# Patient Record
Sex: MALE | Race: WHITE | NOT HISPANIC OR LATINO | Employment: FULL TIME | URBAN - METROPOLITAN AREA
[De-identification: names, ages, dates, MRNs, and addresses within clinical notes are randomized per-mention and may not be internally consistent; named-entity substitution may affect disease eponyms.]

---

## 2017-03-14 ENCOUNTER — OFFICE VISIT (OUTPATIENT)
Dept: FAMILY MEDICINE CLINIC | Facility: CLINIC | Age: 32
End: 2017-03-14

## 2017-03-14 VITALS
DIASTOLIC BLOOD PRESSURE: 78 MMHG | HEIGHT: 68 IN | TEMPERATURE: 97.9 F | WEIGHT: 163.5 LBS | SYSTOLIC BLOOD PRESSURE: 108 MMHG | HEART RATE: 80 BPM | RESPIRATION RATE: 20 BRPM | BODY MASS INDEX: 24.78 KG/M2

## 2017-03-14 DIAGNOSIS — A63.0 CONDYLOMA ACUMINATA: ICD-10-CM

## 2017-03-14 DIAGNOSIS — Z20.2 POSSIBLE EXPOSURE TO STD: Primary | ICD-10-CM

## 2017-03-14 PROCEDURE — 99213 OFFICE O/P EST LOW 20 MIN: CPT | Performed by: FAMILY MEDICINE

## 2017-03-14 RX ORDER — IMIQUIMOD 12.5 MG/.25G
CREAM TOPICAL
Qty: 24 EACH | Refills: 3 | Status: SHIPPED | OUTPATIENT
Start: 2017-03-14 | End: 2017-03-15

## 2017-03-14 NOTE — PROGRESS NOTES
Subjective   Freddie St is a 31 y.o. male.     History of Present Illness     He has had unprotected sex in the last 6 months, 3 partners  They did not have anything but he is worried about exposure  He does have HPV, genital warts  No new lesions, no penile discharge      Review of Systems   Constitutional: Negative.    Genitourinary:        Genital warts       Objective   Physical Exam   Constitutional: He appears well-developed and well-nourished. No distress.   Cardiovascular: Normal rate, regular rhythm and normal heart sounds.    Pulmonary/Chest: Effort normal and breath sounds normal.   Psychiatric: He has a normal mood and affect. His behavior is normal.   Nursing note and vitals reviewed.      Assessment/Plan   Freddie was seen today for std testing.    Diagnoses and all orders for this visit:    Possible exposure to STD  -     Chlamydia trachomatis, Neisseria gonorrhoeae, Trichomonas vaginalis, PCR  -     HIV-1 / O / 2 Ag / Antibody 4th Generation  -     Hepatitis C Antibody  -     RPR    Condyloma acuminata  -     imiquimod (ALDARA) 5 % cream; Apply to lesions three times a week up to 16 weeks for clearance      Check labs, f/u pending results.  aldara for genital warts and consider podofilox in the future. Pt to call back with any issues

## 2017-03-15 ENCOUNTER — OFFICE VISIT (OUTPATIENT)
Dept: FAMILY MEDICINE CLINIC | Facility: CLINIC | Age: 32
End: 2017-03-15

## 2017-03-15 VITALS
RESPIRATION RATE: 16 BRPM | HEART RATE: 104 BPM | HEIGHT: 68 IN | BODY MASS INDEX: 24.92 KG/M2 | SYSTOLIC BLOOD PRESSURE: 110 MMHG | DIASTOLIC BLOOD PRESSURE: 75 MMHG | WEIGHT: 164.4 LBS | TEMPERATURE: 100.1 F

## 2017-03-15 DIAGNOSIS — R68.89 FLU-LIKE SYMPTOMS: ICD-10-CM

## 2017-03-15 DIAGNOSIS — R50.9 FEVER, UNSPECIFIED FEVER CAUSE: Primary | ICD-10-CM

## 2017-03-15 DIAGNOSIS — R52 BODY ACHES: ICD-10-CM

## 2017-03-15 LAB
C TRACH RRNA SPEC QL NAA+PROBE: NEGATIVE
EXPIRATION DATE: NORMAL
FLUAV AG NPH QL: NEGATIVE
FLUBV AG NPH QL: NEGATIVE
HCV AB S/CO SERPL IA: 0.2 S/CO RATIO (ref 0–0.9)
HIV 1+2 AB+HIV1 P24 AG SERPL QL IA: NON REACTIVE
INTERNAL CONTROL: NORMAL
Lab: NORMAL
N GONORRHOEA RRNA SPEC QL NAA+PROBE: NEGATIVE
RPR SER QL: NON REACTIVE
T VAGINALIS RRNA SPEC QL NAA+PROBE: NEGATIVE

## 2017-03-15 PROCEDURE — 87804 INFLUENZA ASSAY W/OPTIC: CPT | Performed by: NURSE PRACTITIONER

## 2017-03-15 PROCEDURE — 99213 OFFICE O/P EST LOW 20 MIN: CPT | Performed by: NURSE PRACTITIONER

## 2017-03-15 RX ORDER — OSELTAMIVIR PHOSPHATE 75 MG/1
75 CAPSULE ORAL 2 TIMES DAILY
Qty: 10 CAPSULE | Refills: 0 | Status: SHIPPED | OUTPATIENT
Start: 2017-03-15 | End: 2017-04-25

## 2017-03-15 NOTE — PROGRESS NOTES
Subjective   Freddie St is a 31 y.o. male.     History of Present Illness   Fever, chills, body aches and HA some nausea that started this morning  Just wants to go home and go to bed  Thinks he has the flu  No OTC meds yet  No vomiting or abdominal pains    The following portions of the patient's history were reviewed and updated as appropriate: allergies, current medications, past family history, past medical history, past social history, past surgical history and problem list.    Review of Systems   Constitutional: Positive for chills, fatigue and fever.   HENT: Positive for rhinorrhea, sneezing and sore throat.    Eyes: Negative.    Respiratory: Positive for cough. Negative for wheezing.    Cardiovascular: Negative.    Gastrointestinal: Positive for nausea. Negative for abdominal distention, abdominal pain and vomiting.   Endocrine: Negative.    Genitourinary: Negative.    Musculoskeletal: Positive for myalgias.   Skin: Negative.    Allergic/Immunologic: Negative.    Neurological: Negative.    Hematological: Negative.    Psychiatric/Behavioral: Negative.        Objective   Physical Exam   Constitutional: He is oriented to person, place, and time. Vital signs are normal. He appears well-developed and well-nourished. He appears ill. No distress.   HENT:   Head: Normocephalic.   Right Ear: Tympanic membrane, external ear and ear canal normal.   Left Ear: Tympanic membrane, external ear and ear canal normal.   Nose: Nose normal. No mucosal edema or rhinorrhea. Right sinus exhibits no maxillary sinus tenderness and no frontal sinus tenderness. Left sinus exhibits no maxillary sinus tenderness and no frontal sinus tenderness.   Mouth/Throat: Uvula is midline, oropharynx is clear and moist and mucous membranes are normal.   Eyes: Conjunctivae and lids are normal. Pupils are equal, round, and reactive to light.   Neck: Trachea normal and normal range of motion. Neck supple. Carotid bruit is not present. No  thyroid mass present.   Cardiovascular: Normal rate, regular rhythm, S1 normal, S2 normal and normal heart sounds.    Pulmonary/Chest: Effort normal and breath sounds normal. No respiratory distress. He has no wheezes. He has no rales.   Abdominal: Soft. Normal appearance and bowel sounds are normal.   Lymphadenopathy:        Head (right side): No tonsillar, no preauricular, no posterior auricular and no occipital adenopathy present.        Head (left side): No tonsillar, no preauricular, no posterior auricular and no occipital adenopathy present.     He has no cervical adenopathy.   Neurological: He is alert and oriented to person, place, and time.   Skin: Skin is warm, dry and intact. No rash noted. No cyanosis. Nails show no clubbing.   Psychiatric: He has a normal mood and affect. His speech is normal and behavior is normal. Judgment and thought content normal. Cognition and memory are normal.   Nursing note and vitals reviewed.      Assessment/Plan   Ferddie was seen today for fever, h.a, nausea.    Diagnoses and all orders for this visit:    Fever, unspecified fever cause  -     POCT Influenza A/B    Body aches    Flu-like symptoms    Other orders  -     oseltamivir (TAMIFLU) 75 MG capsule; Take 1 capsule by mouth 2 (Two) Times a Day.    Flu A&B are negative pt informed   Will treat with Tamiflu as he has symptoms suggestive of the flu  Off work for 2 days  Rest and fluids, Ibuprofen for HA and fever as needed, OTC meds to treat cold sx  F/u if needed.

## 2017-03-21 ENCOUNTER — TELEPHONE (OUTPATIENT)
Dept: FAMILY MEDICINE CLINIC | Facility: CLINIC | Age: 32
End: 2017-03-21

## 2017-03-21 NOTE — TELEPHONE ENCOUNTER
----- Message from Evelyn St sent at 3/16/2017  3:47 PM EDT -----  Patient returning your call. Please call back at [phone].

## 2017-04-25 ENCOUNTER — OFFICE VISIT (OUTPATIENT)
Dept: FAMILY MEDICINE CLINIC | Facility: CLINIC | Age: 32
End: 2017-04-25

## 2017-04-25 VITALS
WEIGHT: 162 LBS | DIASTOLIC BLOOD PRESSURE: 74 MMHG | SYSTOLIC BLOOD PRESSURE: 116 MMHG | HEART RATE: 79 BPM | RESPIRATION RATE: 18 BRPM | HEIGHT: 68 IN | OXYGEN SATURATION: 100 % | BODY MASS INDEX: 24.55 KG/M2 | TEMPERATURE: 98.2 F

## 2017-04-25 DIAGNOSIS — R17 SCLERAL ICTERUS: ICD-10-CM

## 2017-04-25 DIAGNOSIS — R11.0 NAUSEA: ICD-10-CM

## 2017-04-25 DIAGNOSIS — R42 DIZZINESS: ICD-10-CM

## 2017-04-25 DIAGNOSIS — M54.5 ACUTE LOW BACK PAIN, UNSPECIFIED BACK PAIN LATERALITY, WITH SCIATICA PRESENCE UNSPECIFIED: Primary | ICD-10-CM

## 2017-04-25 DIAGNOSIS — R53.83 FATIGUE, UNSPECIFIED TYPE: ICD-10-CM

## 2017-04-25 LAB
BILIRUB BLD-MCNC: NEGATIVE MG/DL
CLARITY, POC: CLEAR
COLOR UR: YELLOW
GLUCOSE UR STRIP-MCNC: NEGATIVE MG/DL
KETONES UR QL: NEGATIVE
LEUKOCYTE EST, POC: NEGATIVE
NITRITE UR-MCNC: NEGATIVE MG/ML
PH UR: 6.5 [PH] (ref 5–8)
PROT UR STRIP-MCNC: NEGATIVE MG/DL
RBC # UR STRIP: NEGATIVE /UL
SP GR UR: 1.02 (ref 1–1.03)
UROBILINOGEN UR QL: NORMAL

## 2017-04-25 PROCEDURE — 99214 OFFICE O/P EST MOD 30 MIN: CPT | Performed by: FAMILY MEDICINE

## 2017-04-25 PROCEDURE — 81003 URINALYSIS AUTO W/O SCOPE: CPT | Performed by: FAMILY MEDICINE

## 2017-04-25 NOTE — PROGRESS NOTES
Chief Complaint   Patient presents with   • Back Pain     lower back pain   • Urinary Tract Infection     frequency, urgency, then not able to go alot.        Subjective     Back Pain   This is a new problem. The current episode started in the past 7 days. The problem occurs constantly. The problem is unchanged. The pain is present in the lumbar spine (right side). The pain does not radiate. The pain is mild. The symptoms are aggravated by sitting. Pertinent negatives include no chest pain, dysuria, fever, leg pain, numbness, tingling or weakness. He has tried nothing for the symptoms. The treatment provided no relief.     Dizziness  Feels dizzy  Hard to concentrate  Last week, Wed, felt funny.   + nausea, had episode of voiding  dizzy spell last week and today  been dizzy since noon today    h/o alcohol abuse, last was end of June 2106. no drug use  no alcohol now    increased water.     + urgency of urine and then decreased amount    no increased NSAIDS    age 16, was ok and then had sudden nausea and vomiting and blood in emesis, had increased bilirubin    some yellowness to eyes    Past Medical History,Medications, Allergies, and social history was reviewed.    Review of Systems   Constitutional: Positive for fatigue. Negative for fever.   HENT: Negative.    Respiratory: Negative.    Cardiovascular: Negative.  Negative for chest pain.   Gastrointestinal: Negative.    Genitourinary: Positive for difficulty urinating and urgency. Negative for dysuria.   Musculoskeletal: Positive for back pain.   Neurological: Positive for dizziness. Negative for tingling, seizures, weakness and numbness.   Psychiatric/Behavioral: Positive for sleep disturbance.       Objective     Physical Exam   Constitutional: He is oriented to person, place, and time. Vital signs are normal. He appears well-developed and well-nourished.   HENT:   Head: Normocephalic and atraumatic.   Right Ear: Hearing, tympanic membrane, external ear and ear  canal normal.   Left Ear: Hearing, tympanic membrane, external ear and ear canal normal.   Mouth/Throat: Oropharynx is clear and moist.   Eyes: Conjunctivae, EOM and lids are normal. Pupils are equal, round, and reactive to light. Scleral icterus (mild) is present.   Neck: Normal range of motion. Neck supple. No thyromegaly present.   Cardiovascular: Normal rate, regular rhythm and normal heart sounds.  Exam reveals no friction rub.    No murmur heard.  Pulmonary/Chest: Effort normal and breath sounds normal. No respiratory distress. He has no wheezes. He has no rales.   Abdominal: Soft. Normal appearance and bowel sounds are normal. He exhibits no distension and no mass. There is no tenderness. There is no rebound and no guarding.   Musculoskeletal: He exhibits no edema.        Lumbar back: He exhibits decreased range of motion and tenderness.   Lymphadenopathy:     He has no cervical adenopathy.   Neurological: He is alert and oriented to person, place, and time. He has normal strength. No cranial nerve deficit. He exhibits normal muscle tone. Gait normal.   Reflex Scores:       Patellar reflexes are 2+ on the right side and 2+ on the left side.  Straight leg raise is negative bilaterally   Skin: Skin is warm and dry.   Psychiatric: He has a normal mood and affect. His speech is normal and behavior is normal. Cognition and memory are normal.   Nursing note and vitals reviewed.    urine was normal      Assessment/Plan     Problem List Items Addressed This Visit     None      Visit Diagnoses     Acute low back pain, unspecified back pain laterality, with sciatica presence unspecified    -  Primary    Relevant Orders    POC Urinalysis Dipstick, Automated (Completed)    Scleral icterus        Relevant Orders    Comprehensive Metabolic Panel    CBC & Differential    TSH    Dizziness        Relevant Orders    Comprehensive Metabolic Panel    CBC & Differential    TSH    Nausea        Relevant Orders    Comprehensive  Metabolic Panel    CBC & Differential    TSH    Fatigue, unspecified type        Relevant Orders    Comprehensive Metabolic Panel    CBC & Differential    TSH             DISCUSSION  Unclear cause of symptoms  Check labs as noted  Increase fluids  rest  Further plan after labs reviewed.         MEDICATIONS PRESCRIBED  Requested Prescriptions      No prescriptions requested or ordered in this encounter          Pato Tello MD

## 2017-04-26 LAB
ALBUMIN SERPL-MCNC: 4.5 G/DL (ref 3.2–4.8)
ALBUMIN/GLOB SERPL: 2.3 G/DL (ref 1.5–2.5)
ALP SERPL-CCNC: 44 U/L (ref 25–100)
ALT SERPL-CCNC: 28 U/L (ref 7–40)
AST SERPL-CCNC: 23 U/L (ref 0–33)
BASOPHILS # BLD AUTO: 0.03 10*3/MM3 (ref 0–0.2)
BASOPHILS NFR BLD AUTO: 0.4 % (ref 0–1)
BILIRUB SERPL-MCNC: 1.8 MG/DL (ref 0.3–1.2)
BUN SERPL-MCNC: 8 MG/DL (ref 9–23)
BUN/CREAT SERPL: 10 (ref 7–25)
CALCIUM SERPL-MCNC: 9.8 MG/DL (ref 8.7–10.4)
CHLORIDE SERPL-SCNC: 108 MMOL/L (ref 99–109)
CO2 SERPL-SCNC: 27 MMOL/L (ref 20–31)
CREAT SERPL-MCNC: 0.8 MG/DL (ref 0.6–1.3)
EOSINOPHIL # BLD AUTO: 0.2 10*3/MM3 (ref 0.1–0.3)
EOSINOPHIL NFR BLD AUTO: 2.8 % (ref 0–3)
ERYTHROCYTE [DISTWIDTH] IN BLOOD BY AUTOMATED COUNT: 13.3 % (ref 11.3–14.5)
GLOBULIN SER CALC-MCNC: 2 GM/DL
GLUCOSE SERPL-MCNC: 132 MG/DL (ref 70–100)
HCT VFR BLD AUTO: 43 % (ref 38.9–50.9)
HGB BLD-MCNC: 14.4 G/DL (ref 13.1–17.5)
IMM GRANULOCYTES # BLD: 0.07 10*3/MM3 (ref 0–0.03)
IMM GRANULOCYTES NFR BLD: 1 % (ref 0–0.6)
LYMPHOCYTES # BLD AUTO: 2.28 10*3/MM3 (ref 0.6–4.8)
LYMPHOCYTES NFR BLD AUTO: 31.9 % (ref 24–44)
MCH RBC QN AUTO: 28.4 PG (ref 27–31)
MCHC RBC AUTO-ENTMCNC: 33.5 G/DL (ref 32–36)
MCV RBC AUTO: 84.8 FL (ref 80–99)
MONOCYTES # BLD AUTO: 0.55 10*3/MM3 (ref 0–1)
MONOCYTES NFR BLD AUTO: 7.7 % (ref 0–12)
NEUTROPHILS # BLD AUTO: 4.01 10*3/MM3 (ref 1.5–8.3)
NEUTROPHILS NFR BLD AUTO: 56.2 % (ref 41–71)
PLATELET # BLD AUTO: 240 10*3/MM3 (ref 150–450)
POTASSIUM SERPL-SCNC: 3.9 MMOL/L (ref 3.5–5.5)
PROT SERPL-MCNC: 6.5 G/DL (ref 5.7–8.2)
RBC # BLD AUTO: 5.07 10*6/MM3 (ref 4.2–5.76)
SODIUM SERPL-SCNC: 142 MMOL/L (ref 132–146)
TSH SERPL DL<=0.005 MIU/L-ACNC: 1.17 MIU/ML (ref 0.35–5.35)
WBC # BLD AUTO: 7.14 10*3/MM3 (ref 3.5–10.8)

## 2017-04-27 ENCOUNTER — TELEPHONE (OUTPATIENT)
Dept: FAMILY MEDICINE CLINIC | Facility: CLINIC | Age: 32
End: 2017-04-27

## 2017-04-27 DIAGNOSIS — R10.9 RIGHT FLANK PAIN: ICD-10-CM

## 2017-04-27 DIAGNOSIS — R17 ELEVATED BILIRUBIN: Primary | ICD-10-CM

## 2017-04-27 NOTE — TELEPHONE ENCOUNTER
Sorry for the delay.      Blood work shows mild elevation in blood sugar.  Not sure if he was fasting.  Also, one liver test called bilirubin was elevated but the other ones are normal.  There is a condition called Gilbert's disease where he could have this elevation.  However, given his symptoms and not feeling well, and pain in the right back, recommend check ultrasound of the right upper quadrant.  Diagnosis : elevated bilirubin and back pain.  White blood cell count was normal and red blood cell count is normal.  Also, thyroid is normal.  Is he feeling worse?

## 2017-04-27 NOTE — TELEPHONE ENCOUNTER
----- Message from Mercy Cohen sent at 4/27/2017  2:11 PM EDT -----  Contact: GHISLAINE;PT CALLED  PT HAS CALLED AGAIN REGARDING HIS LAB WORK    PT-PLEASE CALL NUMBER FROM PREVIOUS PHONE MESSAGE  HE IS STILL HAVE DIZZY SPELLS;HE IS WORRIED AND NOT  FEELING WELL

## 2017-04-28 NOTE — TELEPHONE ENCOUNTER
Informed pt of results and went to enter U/S of RUQ but do not see that one (just liver). Please enter the order for pt.

## 2017-05-01 ENCOUNTER — HOSPITAL ENCOUNTER (OUTPATIENT)
Dept: ULTRASOUND IMAGING | Facility: HOSPITAL | Age: 32
End: 2017-05-01

## 2017-05-02 ENCOUNTER — HOSPITAL ENCOUNTER (OUTPATIENT)
Dept: ULTRASOUND IMAGING | Facility: HOSPITAL | Age: 32
Discharge: HOME | End: 2017-05-02
Admitting: FAMILY MEDICINE

## 2017-05-02 DIAGNOSIS — R10.9 RIGHT FLANK PAIN: ICD-10-CM

## 2017-05-02 DIAGNOSIS — R17 ELEVATED BILIRUBIN: ICD-10-CM

## 2017-05-02 PROCEDURE — 76705 ECHO EXAM OF ABDOMEN: CPT

## 2018-06-20 ENCOUNTER — OFFICE VISIT (OUTPATIENT)
Dept: FAMILY MEDICINE CLINIC | Facility: CLINIC | Age: 33
End: 2018-06-20

## 2018-06-20 VITALS
TEMPERATURE: 98 F | BODY MASS INDEX: 27.04 KG/M2 | HEART RATE: 64 BPM | RESPIRATION RATE: 20 BRPM | SYSTOLIC BLOOD PRESSURE: 118 MMHG | DIASTOLIC BLOOD PRESSURE: 62 MMHG | WEIGHT: 178.4 LBS | HEIGHT: 68 IN

## 2018-06-20 DIAGNOSIS — F39 MOOD DISORDER (HCC): Primary | ICD-10-CM

## 2018-06-20 PROCEDURE — 99213 OFFICE O/P EST LOW 20 MIN: CPT | Performed by: FAMILY MEDICINE

## 2018-06-20 RX ORDER — RISPERIDONE 2 MG/1
2 TABLET ORAL
Qty: 30 TABLET | Refills: 2 | Status: SHIPPED | OUTPATIENT
Start: 2018-06-20 | End: 2018-12-14 | Stop reason: SDUPTHER

## 2018-06-20 RX ORDER — RISPERIDONE 2 MG/1
2 TABLET ORAL
COMMUNITY
End: 2018-06-20 | Stop reason: SDUPTHER

## 2018-06-20 NOTE — PROGRESS NOTES
Subjective   Freddie St is a 32 y.o. male.   Chief Complaint   Patient presents with   • mood disorder     He does not like his psychiatrist and would like to get his Rx changed to getting it from his PCP     History of Present Illness   He has been following with psychiatry for treatment, would like PCP to treat instead.   He is currently getting treated at [facility] and currently not satisfied with his provider. He hasn't been seen at any other psychiatry office.    States that he was getting treated for mood disorder with Risperdal x 1.5 years. He is stable with this treatment.   Denies hallucinations, suicidal or homicidal thoughts.     The following portions of the patient's history were reviewed and updated as appropriate: allergies, current medications, past family history, past medical history, past social history, past surgical history and problem list.    Review of Systems   Constitutional: Negative for chills and fever.   Respiratory: Negative for cough and shortness of breath.    Cardiovascular: Negative for chest pain and palpitations.   Gastrointestinal: Negative.    Endocrine: Negative.  Negative for breast discharge.   Allergic/Immunologic: Negative.    Neurological: Negative for headache and confusion.   Psychiatric/Behavioral: Negative for agitation, behavioral problems, dysphoric mood, hallucinations, self-injury, sleep disturbance, suicidal ideas and depressed mood. The patient is not nervous/anxious.        Objective   Physical Exam   Constitutional: He is oriented to person, place, and time. He appears well-developed and well-nourished. No distress.   HENT:   Head: Normocephalic.   Right Ear: External ear normal.   Left Ear: External ear normal.   Nose: Nose normal.   Eyes: Conjunctivae are normal.   Neck: Normal range of motion.   Cardiovascular: Normal rate, regular rhythm and normal heart sounds.    No murmur heard.  Pulmonary/Chest: Effort normal and breath sounds normal.    Musculoskeletal: He exhibits no edema or deformity.   Neurological: He is alert and oriented to person, place, and time.   Skin: Skin is warm and dry.   Psychiatric: His speech is normal and behavior is normal. Judgment and thought content normal. Cognition and memory are normal.   Flat affect   Nursing note and vitals reviewed.        Assessment/Plan   Freddie was seen today for mood disorder.    Diagnoses and all orders for this visit:    Mood disorder  -     risperiDONE (risperDAL) 2 MG tablet; Take 1 tablet by mouth every night at bedtime.      He is stable on his current treatment with Risperdal, will continue.   If symptoms worsen, will refer to new psychiatry office.   Advised to go to ER if hallucinations, SI/HI develop.

## 2018-06-20 NOTE — PATIENT INSTRUCTIONS
Go to the nearest ER or return to clinic if symptoms worsen, fever/chill develop    Risperidone tablets  What is this medicine?  RISPERIDONE (ris PER i done) is an antipsychotic. It is used to treat schizophrenia, bipolar disorder, and some symptoms of autism.  This medicine may be used for other purposes; ask your health care provider or pharmacist if you have questions.  COMMON BRAND NAME(S): Risrochelledal  What should I tell my health care provider before I take this medicine?  They need to know if you have any of these conditions:  -blood disorder or disease  -dementia  -diabetes or a family history of diabetes  -difficulty swallowing  -heart disease or previous heart attack  -history of brain tumor or head injury  -history of breast cancer  -irregular heartbeat or low blood pressure  -kidney or liver disease  -Parkinson's disease  -seizures (convulsions)  -an unusual or allergic reaction to risperidone, paliperidone, other medicines, foods, dyes, or preservatives  -pregnant or trying to get pregnant  -breast-feeding  How should I use this medicine?  Take this medicine by mouth with a glass of water. Follow the directions on the prescription label. You can take it with or without food. If it upsets your stomach, take it with food. Take your medicine at regular intervals. Do not take it more often than directed. Do not stop taking except on your doctor's advice.  Talk to your pediatrician regarding the use of this medicine in children. While this drug may be prescribed for children as young as 5 years of age for selected conditions, precautions do apply.  Overdosage: If you think you have taken too much of this medicine contact a poison control center or emergency room at once.  NOTE: This medicine is only for you. Do not share this medicine with others.  What if I miss a dose?  If you miss a dose, take it as soon as you can. If it is almost time for your next dose, take only that dose. Do not take double or extra  doses.  What may interact with this medicine?  Do not take this medicine with any of the following medications:  -certain medicines for fungal infections like fluconazole, itraconazole, ketoconazole, posaconazole, voriconazole  -cisapride  -dofetilide  -dronedarone  -droperidol  -pimozide  -sparfloxacin  -thioridazine  This medicine may also interact with the following medications:  -arsenic trioxide  -certain antibiotics like clarithromycin, gatifloxacin, levofloxacin, moxifloxacin, pentamidine, rifampin  -certain medicines for blood pressure  -certain medicines for cancer  -certain medicines for irregular heart beat  -certain medications for Parkinson's disease like levodopa  -certain medicines for seizures like carbamazepine  -certain medicines for sleep or sedation  -narcotic medicines for pain  -other medicines for mental anxiety, depression, or psychotic disturbances  -other medicines that prolong the QT interval (cause an abnormal heart rhythm)  -ritonavir  This list may not describe all possible interactions. Give your health care provider a list of all the medicines, herbs, non-prescription drugs, or dietary supplements you use. Also tell them if you smoke, drink alcohol, or use illegal drugs. Some items may interact with your medicine.  What should I watch for while using this medicine?  Visit your doctor or health care professional for regular checks on your progress. It may be several weeks before you see the full effects. Do not suddenly stop taking this medicine. You may need to gradually reduce the dose. Only stop taking this medicine on the advice of your doctor or health care professional.  You may get dizzy or drowsy. Do not drive, use machinery, or do anything that needs mental alertness until you know how this medicine affects you. Do not stand or sit up quickly, especially if you are an older patient. This reduces the risk of dizzy or fainting spells. Alcohol can increase dizziness and  drowsiness. Avoid alcoholic drinks. You can get a hangover effect the morning after a bedtime dose.  Do not treat yourself for colds, diarrhea or allergies. Ask your doctor or health care professional for advice, some nonprescription medicines may increase possible side effects.  This medicine can reduce the response of your body to heat or cold. Dress warm in cold weather and stay hydrated in hot weather. If possible, avoid extreme temperatures like saunas, hot tubs, very hot or cold showers, or activities that can cause dehydration such as vigorous exercise.  What side effects may I notice from receiving this medicine?  Side effects that you should report to your doctor or health care professional as soon as possible:  -aching muscles and joints  -confusion  -fainting spells  -fast or irregular heartbeat  -fever or chills, sore throat  -increased hunger or thirst  -increased urination  -loss of balance, difficulty walking or falls  -stiffness, spasms, trembling  -uncontrollable head, mouth, neck, arm, or leg movements  -unusually weak or tired  Side effects that usually do not require medical attention (report to your doctor or health care professional if they continue or are bothersome):  -constipation  -decreased sexual ability  -difficulty sleeping  -drowsiness or dizziness  -increase or decrease in saliva  -nausea, vomiting  -weight gain  This list may not describe all possible side effects. Call your doctor for medical advice about side effects. You may report side effects to FDA at 4-110-FDA-1729.  Where should I keep my medicine?  Keep out of the reach of children.  Store at room temperature between 15 and 25 degrees C (59 and 77 degrees F). Protect from light. Throw away any unused medicine after the expiration date.  NOTE: This sheet is a summary. It may not cover all possible information. If you have questions about this medicine, talk to your doctor, pharmacist, or health care provider.  © 2018  [employer]vier/Gold Standard (2017-08-07 18:50:25)

## 2018-12-14 ENCOUNTER — TELEPHONE (OUTPATIENT)
Dept: FAMILY MEDICINE CLINIC | Facility: CLINIC | Age: 33
End: 2018-12-14

## 2018-12-14 DIAGNOSIS — F39 MOOD DISORDER (HCC): ICD-10-CM

## 2018-12-14 RX ORDER — RISPERIDONE 2 MG/1
2 TABLET ORAL
Qty: 30 TABLET | Refills: 5 | Status: SHIPPED | OUTPATIENT
Start: 2018-12-14

## 2018-12-14 NOTE — TELEPHONE ENCOUNTER
----- Message from Jen Sharif sent at 12/14/2018 11:55 AM EST -----  Contact: pt; kenn Larose    risperiDONE (risperDAL) 2 MG tablet     loida castillo    Phone: [phone]

## 2019-08-14 ENCOUNTER — OFFICE VISIT (OUTPATIENT)
Dept: FAMILY MEDICINE CLINIC | Facility: CLINIC | Age: 34
End: 2019-08-14

## 2019-08-14 VITALS
HEART RATE: 84 BPM | WEIGHT: 183 LBS | SYSTOLIC BLOOD PRESSURE: 128 MMHG | BODY MASS INDEX: 27.83 KG/M2 | DIASTOLIC BLOOD PRESSURE: 80 MMHG | RESPIRATION RATE: 16 BRPM | TEMPERATURE: 99.1 F

## 2019-08-14 DIAGNOSIS — F39 MOOD DISORDER (HCC): Primary | ICD-10-CM

## 2019-08-14 PROCEDURE — 99212 OFFICE O/P EST SF 10 MIN: CPT | Performed by: FAMILY MEDICINE

## 2019-08-14 NOTE — PROGRESS NOTES
Subjective   Freddie St is a 34 y.o. male.   Chief Complaint   Patient presents with   • form completion     History of Present Illness   He has a history irritability, mood disorder. He is currently taking risperidone to treat condition, doing well.   In the past, he has taken different types of medications for mood disorder without improvement. Was following with [facility] for psychiatric care, but requested that our office take over prescribing the medication. Risperidone works the best for him.   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   No history of psychosis, schizophrenia.     The following portions of the patient's history were reviewed and updated as appropriate: allergies, current medications, past family history, past medical history, past social history, past surgical history and problem list.    Review of Systems   Constitutional: Negative for activity change and appetite change.   Respiratory: Negative.    Psychiatric/Behavioral: Negative for agitation, behavioral problems, dysphoric mood, hallucinations, suicidal ideas and stress.       Objective   Physical Exam   Constitutional: He is oriented to person, place, and time. He appears well-developed and well-nourished. No distress.   HENT:   Head: Normocephalic.   Right Ear: External ear normal.   Left Ear: External ear normal.   Nose: Nose normal.   Eyes: Conjunctivae are normal.   Cardiovascular: Normal rate and regular rhythm.   Pulmonary/Chest: Effort normal.   Neurological: He is alert and oriented to person, place, and time.   Psychiatric: He has a normal mood and affect. His behavior is normal. Judgment and thought content normal.   Nursing note and vitals reviewed.        Assessment/Plan   Freddie was seen today for form completion.    Diagnoses and all orders for this visit:    Mood disorder (CMS/HCC)      Stable with current treatment, doesn't need refill  at this time. Has been on risperidone for >1 year without any complication.   Form completed, faxed and a copy was provided to patient.